# Patient Record
(demographics unavailable — no encounter records)

---

## 2017-03-19 NOTE — RAD
Indication: Pain after fall at work.



Technique: 3 views of the right wrist are submitted for review. No comparison

is available.



Findings: Healed fracture deformity of the distal radius is noted. There is no

evidence of an acute fracture. There is an old ulnar styloid process fracture.

There is no dislocation. There is mild remodeling of the radial articular

surface with the carpals.



Impression: No evidence of an acute fracture. Chronic fracture deformities of

the distal radius and ulnar styloid process.

## 2017-03-19 NOTE — PHYS DOC
Past Medical History


Past Medical History:  No Pertinent History, Other


Additional Past Medical Histor:  Kwabena gaona


Past Surgical History:  Other


Additional Past Surgical Histo:  R. arm


Alcohol Use:  None


Drug Use:  None





Adult General


Chief Complaint


Chief Complaint:  MECHANICAL FALL





[facility]


HPI


Patient is a 49  year old female with complaint of right wrist, right toes, low 

back, left knee and bilateral groin pain secondary to a slip and fall at 

approximately 8:30 this evening. Patient states that she was at work as a 

 when she slipped and fell causing herself to go into splits. She denies 

striking her head or loss of consciousness. She states that she was urged by 

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 

seen. She has been able to fully bear weight and walk. She comes to the 

emergency room at 12:30 AM for the fall that happened 4 hours ago. She drove 

herself here without any difficulty. Patient denies any history of bone forming 

disorders. She states that she has had a fracture right wrist or required 

surgical pinning. She denies any history of bone forming disorders. She denies 

any numbness/tingling/saddle anesthesia or incontinence of urine or bowel since 

the fall.





Review of Systems


Review of Systems





Constitutional: Denies fever or chills []


Eyes: Denies change in visual acuity, redness, or eye pain []


HENT: Denies nasal congestion or sore throat []


Respiratory: Denies cough or shortness of breath []


Cardiovascular: No additional information not addressed in HPI []


GI: Denies abdominal pain, nausea, vomiting, bloody stools or diarrhea []


: Denies dysuria or hematuria []


Musculoskeletal: Denies back pain or joint pain []


Integument: Denies rash or skin lesions []


Neurologic: Denies headache, focal weakness or sensory changes []


Endocrine: Denies polyuria or polydipsia []





Allergies


Allergies





 Allergies








Coded Allergies Type Severity Reaction Last Updated Verified


 


  No Known Drug Allergies    7/19/15 No











Physical Exam


Physical Exam





Constitutional: Well developed, well nourished, no acute distress, non-toxic 

appearance. Patient walks into the emergency department with a steady, unaided 

gait. As patient is explained to me her fall today, she was very expressed with 

her hands providing full range of motion with both bilateral upper and lower 

extremities. She was show no evidence of derangement in this time. However, 

when he comes time for focal examination, she is insistent that she has some 

type of problem with her right wrist, the toes of her right foot, her left knee

, bilateral groin and low back.


HENT: Normocephalic, atraumatic, bilateral external ears normal, oropharynx 

moist, no oral exudates, nose normal. []


Eyes: PERRLA, EOMI, conjunctiva normal, no discharge. [] 


Neck: Normal range of motion, no tenderness, supple, no stridor. [] 


Cardiovascular:Heart rate regular rhythm, no murmur []


Lungs & Thorax:  Bilateral breath sounds clear to auscultation []


Abdomen: Bowel sounds normal, soft, no tenderness, no masses, no pulsatile 

masses. [] 


Skin: Warm, dry, no erythema, no rash. [] 


Back: Back is without any evidence of injury. There is no CVA tenderness. 

Patient complains of pain to the bilateral paraspinous soft tissues at the 

level of L3-L5. There is no palpable defect, deformity or spasm. I am unable to 

appreciate any swelling or deformity to patient's right wrist area did patient 

points to the distal portion of her ulna states that is the area of pain. There 

is no palpable defect, deformity, instability or crepitus. She demonstrates 

full range of motion. Hand is neurovascularly intact and nontender to 

palpation. Patient's right foot is normal in appearance. She complains of pain 

to all 5 toes. Patient's toes without swelling, discoloration or deformity. 

Patient's left knee is normal in appearance. She complains of pain to bilateral 

joint lines without palpable defect, deformity, instability or crepitus. Flexor 

and extensor mechanism are intact at a high riding patella. Ligaments are 

stable solid endpoints. All extremities are neurovascularly intact. Patient is 

even able to press off of the examination table with both hands that she shifts 

her self around for examination.


Extremities: No tenderness, no cyanosis, no clubbing, ROM intact, no edema. [] 


Neurologic: Alert and oriented X 3, normal motor function, normal sensory 

function, no focal deficits noted. []


Psychologic: Affect normal, judgement normal, mood normal. []





Current Patient Data


Vital Signs





 Vital Signs








  Date Time  Temp Pulse Resp B/P Pulse Ox O2 Delivery O2 Flow Rate FiO2


 


3/19/17 00:35 98.6 70 18  100 Room Air  





 98.6       











EKG


EKG


[]





Radiology/Procedures


Radiology/Procedures


 3 Images of patient's lumbosacral spine, 3 images of patient's right wrist and 

to images of patient's right foot were performed with adequate technique. There 

is no evidence of acute bony process such as fracture dislocation. There is no 

malalignment of her lumbar spine.





Course & Med Decision Making


Course & Med Decision Making


Pertinent Labs and Imaging studies reviewed. (See chart for details)





[]





Dragon Disclaimer


Dragon Disclaimer


This electronic medical record was generated, in whole or in part, using a 

voice recognition dictation system.





Departure


Departure


Impression:  


 Primary Impression:  


 Fall


 Additional Impressions:  


 Multiple contusions


 Groin strain


Disposition:  01 HOME, SELF-CARE


Condition:  GOOD


Referrals:  


NO PCP (PCP)


Patient Instructions:  Contusion, Easy-to-Read, Groin Strain, Lumbosacral Strain





Additional Instructions:


1. The x-rays performed here today show no broken bones or dislocations.


2. Review the discharge instructions provided for self-care and reasons to 

return to the emergency department.


3. Take the medication as prescribed.


4. Follow-up with Workmen's Comp. as outlined on your additional discharge 

paperwork.


Scripts


Orphenadrine Citrate 100 Mg Tablet.er100 Mg PO BID muscle relaxer #14 


   Prov:GEORGIA MEDRANO         3/19/17


Naproxen Sodium (Anaprox Ds)550 Mg [id number] Mg PO BID PAIN #20 


   Prov:GEORGIA MEDRANO         3/19/17





Problem Qualifiers








GEORGIA MEDRANO Mar 19, 2017 01:04

## 2017-03-19 NOTE — RAD
Indication: Pain after fall at work.



Technique: 4 images of the lumbar spine are submitted for review. No

comparison is available.



Findings: There is no fracture or dislocation. Vertebral body height is

maintained. Mild endplate spurring is greatest at L1-L2 anteriorly. Facet

hypertrophy is greatest at L4-L5 and L5-S1.



Impression: Mild degenerative changes in the lumbar spine.

## 2017-03-19 NOTE — RAD
Indication: Pain after fall at work.



Technique: 3 views of the right foot are submitted for review. No comparison

is available.



Findings: There is no fracture or dislocation. There is a plantar calcaneal

spur and spur at the insertion of the Achilles tendon.



Impression: Negative for fracture.